# Patient Record
Sex: MALE | Race: OTHER | Employment: OTHER | URBAN - METROPOLITAN AREA
[De-identification: names, ages, dates, MRNs, and addresses within clinical notes are randomized per-mention and may not be internally consistent; named-entity substitution may affect disease eponyms.]

---

## 2023-10-10 ENCOUNTER — NEW PATIENT (OUTPATIENT)
Facility: LOCATION | Age: 23
End: 2023-10-10

## 2023-10-10 DIAGNOSIS — H52.13: ICD-10-CM

## 2023-10-10 PROCEDURE — 92015 DETERMINE REFRACTIVE STATE: CPT

## 2023-10-10 PROCEDURE — 92310L CONTACT LENS

## 2023-10-10 PROCEDURE — 92004 COMPRE OPH EXAM NEW PT 1/>: CPT

## 2023-10-10 ASSESSMENT — KERATOMETRY
OD_K2POWER_DIOPTERS: 44.75
OS_K1POWER_DIOPTERS: 43.50
OS_AXISANGLE2_DEGREES: 89
OS_AXISANGLE_DEGREES: 179
OD_AXISANGLE2_DEGREES: 84
OS_K2POWER_DIOPTERS: 45.00
OD_K1POWER_DIOPTERS: 43.50
OD_AXISANGLE_DEGREES: 174

## 2023-10-10 ASSESSMENT — VISUAL ACUITY
OU_CC: 20/20
OS_CC: 20/25+3
OD_CC: 20/25

## 2023-10-10 ASSESSMENT — TONOMETRY
OD_IOP_MMHG: 12
OS_IOP_MMHG: 15

## 2023-12-15 ENCOUNTER — CONSULTATION/EVALUATION (OUTPATIENT)
Dept: URBAN - METROPOLITAN AREA CLINIC 14 | Facility: CLINIC | Age: 23
End: 2023-12-15

## 2023-12-15 DIAGNOSIS — H52.13: ICD-10-CM

## 2023-12-15 PROCEDURE — 99499LKFN LASIK CONSULT NON CANDIDATE

## 2023-12-15 ASSESSMENT — VISUAL ACUITY
OD_CC: 20/25
OD_BCVA: 20/20
OS_BCVA: 20/20
OS_CC: 20/25

## 2023-12-15 ASSESSMENT — KERATOMETRY
OD_K1POWER_DIOPTERS: 43.50
OS_AXISANGLE2_DEGREES: 89
OD_K2POWER_DIOPTERS: 44.75
OD_AXISANGLE2_DEGREES: 84
OS_K2POWER_DIOPTERS: 45.00
OD_AXISANGLE_DEGREES: 174
OS_K1POWER_DIOPTERS: 43.50
OS_AXISANGLE_DEGREES: 179

## 2023-12-15 ASSESSMENT — PACHYMETRY
OD_CT_UM: 577
OS_CT_UM: 573

## 2024-10-28 ENCOUNTER — COMPREHENSIVE EXAM (OUTPATIENT)
Dept: URBAN - METROPOLITAN AREA CLINIC 10 | Facility: CLINIC | Age: 24
End: 2024-10-28

## 2024-10-28 DIAGNOSIS — H52.13: ICD-10-CM

## 2024-10-28 PROCEDURE — 92310-1 LEVEL 1 SOFT LENS UPDATE

## 2024-10-28 PROCEDURE — 92014 COMPRE OPH EXAM EST PT 1/>: CPT

## 2024-10-28 PROCEDURE — 92015 DETERMINE REFRACTIVE STATE: CPT
